# Patient Record
Sex: FEMALE | Employment: OTHER | URBAN - METROPOLITAN AREA
[De-identification: names, ages, dates, MRNs, and addresses within clinical notes are randomized per-mention and may not be internally consistent; named-entity substitution may affect disease eponyms.]

---

## 2019-08-04 ENCOUNTER — APPOINTMENT (OUTPATIENT)
Dept: RADIOLOGY | Facility: IMAGING CENTER | Age: 49
End: 2019-08-04
Attending: NURSE PRACTITIONER
Payer: COMMERCIAL

## 2019-08-04 ENCOUNTER — OFFICE VISIT (OUTPATIENT)
Dept: URGENT CARE | Facility: CLINIC | Age: 49
End: 2019-08-04
Payer: COMMERCIAL

## 2019-08-04 VITALS
BODY MASS INDEX: 27.89 KG/M2 | HEART RATE: 112 BPM | RESPIRATION RATE: 14 BRPM | HEIGHT: 65 IN | TEMPERATURE: 99.6 F | SYSTOLIC BLOOD PRESSURE: 130 MMHG | OXYGEN SATURATION: 98 % | WEIGHT: 167.4 LBS | DIASTOLIC BLOOD PRESSURE: 76 MMHG

## 2019-08-04 DIAGNOSIS — S97.112A CRUSHING INJURY OF LEFT GREAT TOE, INITIAL ENCOUNTER: ICD-10-CM

## 2019-08-04 DIAGNOSIS — S97.111A CRUSHING INJURY OF RIGHT GREAT TOE, INITIAL ENCOUNTER: ICD-10-CM

## 2019-08-04 DIAGNOSIS — S92.402A CLOSED DISPLACED FRACTURE OF PHALANX OF LEFT GREAT TOE, UNSPECIFIED PHALANX, INITIAL ENCOUNTER: ICD-10-CM

## 2019-08-04 DIAGNOSIS — S90.412A ABRASION OF LEFT GREAT TOE, INITIAL ENCOUNTER: ICD-10-CM

## 2019-08-04 PROCEDURE — 73660 X-RAY EXAM OF TOE(S): CPT | Mod: TC,RT | Performed by: NURSE PRACTITIONER

## 2019-08-04 PROCEDURE — 99204 OFFICE O/P NEW MOD 45 MIN: CPT | Performed by: NURSE PRACTITIONER

## 2019-08-04 ASSESSMENT — ENCOUNTER SYMPTOMS
CHILLS: 0
WEAKNESS: 0
DIZZINESS: 0
VOMITING: 0
NAUSEA: 0
JOINT SWELLING: 1
FEVER: 0
MYALGIAS: 0
EYE PAIN: 0
SHORTNESS OF BREATH: 0
NUMBNESS: 0
SORE THROAT: 0

## 2019-08-04 NOTE — PROGRESS NOTES
Subjective:     Fernanda Gomez is a 49 y.o. female who presents for Foot Swelling (LT foot injury cement block fell on it yesterday)       Foot Problem   This is a new problem. The current episode started yesterday (dropped cement brick on left foot). The problem occurs constantly. The problem has been unchanged. Associated symptoms include joint swelling. Pertinent negatives include no chest pain, chills, fever, myalgias, nausea, numbness, rash, sore throat, vomiting or weakness. Associated symptoms comments: Swelling with bruising of left great toe. . The symptoms are aggravated by walking. She has tried NSAIDs for the symptoms. The treatment provided no relief.   History reviewed. No pertinent past medical history.History reviewed. No pertinent surgical history.  Social History     Socioeconomic History   • Marital status:      Spouse name: Not on file   • Number of children: Not on file   • Years of education: Not on file   • Highest education level: Not on file   Occupational History   • Not on file   Social Needs   • Financial resource strain: Not on file   • Food insecurity:     Worry: Not on file     Inability: Not on file   • Transportation needs:     Medical: Not on file     Non-medical: Not on file   Tobacco Use   • Smoking status: Never Smoker   • Smokeless tobacco: Never Used   Substance and Sexual Activity   • Alcohol use: Not on file   • Drug use: Not on file   • Sexual activity: Not on file   Lifestyle   • Physical activity:     Days per week: Not on file     Minutes per session: Not on file   • Stress: Not on file   Relationships   • Social connections:     Talks on phone: Not on file     Gets together: Not on file     Attends Religion service: Not on file     Active member of club or organization: Not on file     Attends meetings of clubs or organizations: Not on file     Relationship status: Not on file   • Intimate partner violence:     Fear of current or ex partner: Not on file      "Emotionally abused: Not on file     Physically abused: Not on file     Forced sexual activity: Not on file   Other Topics Concern   • Not on file   Social History Narrative   • Not on file    History reviewed. No pertinent family history. Review of Systems   Constitutional: Negative for chills and fever.   HENT: Negative for sore throat.    Eyes: Negative for pain.   Respiratory: Negative for shortness of breath.    Cardiovascular: Negative for chest pain.   Gastrointestinal: Negative for nausea and vomiting.   Genitourinary: Negative for hematuria.   Musculoskeletal: Positive for joint pain and joint swelling. Negative for myalgias.   Skin: Negative for rash.   Neurological: Negative for dizziness, weakness and numbness.   No Known Allergies   Objective:   /76 (BP Location: Left arm, Patient Position: Sitting, BP Cuff Size: Adult)   Pulse (!) 112   Temp 37.6 °C (99.6 °F)   Resp 14   Ht 1.638 m (5' 4.5\")   Wt 75.9 kg (167 lb 6.4 oz)   SpO2 98%   BMI 28.29 kg/m²   Physical Exam   Constitutional: She is oriented to person, place, and time. She appears well-developed and well-nourished. No distress.   HENT:   Head: Normocephalic and atraumatic.   Eyes: Pupils are equal, round, and reactive to light. Conjunctivae and EOM are normal.   Cardiovascular: Normal rate and regular rhythm.   No murmur heard.  Pulmonary/Chest: Effort normal and breath sounds normal. No respiratory distress.   Abdominal: Soft. She exhibits no distension. There is no tenderness.   Musculoskeletal:        Left foot: There is decreased range of motion, tenderness and bony tenderness. There is no swelling and no deformity.        Feet:    Neurological: She is alert and oriented to person, place, and time. She has normal reflexes. No sensory deficit.   Skin: Skin is warm and dry.   Psychiatric: She has a normal mood and affect.         Assessment/Plan:   Assessment    1. Crushing injury of left great toe, initial encounter  DX-TOE(S) 2+ " RIGHT    REFERRAL TO SPORTS MEDICINE   2. Closed displaced fracture of phalanx of left great toe, unspecified phalanx, initial encounter  REFERRAL TO SPORTS MEDICINE   3. Abrasion of left great toe, initial encounter  mupirocin (BACTROBAN) 2 % Ointment     Xray results  1.  Acute comminuted nondisplaced fracture of the midportion of the distal phalanx of the left great toe with possible extension of the fracture to the articular surface of the base of the distal phalanx.  2.  No dislocation.  3.  No other toe fracture identified.      Fracture noted of the left great phalanx.  Patient apply mupirocin topically to small abrasion.  Patient placed in a walking boot for support.  Will patient follow-up with sports medicine for further evaluation management.. Advised may take 600-800 mg ibuprofen 3 times daily as needed for pain.   Patient given precautionary s/sx that mandate immediate follow up and evaluation in the ED. Advised of risks of not doing so.    DDX, Supportive care, and indications for immediate follow-up discussed with patient.    Instructed to return to clinic or nearest emergency department if we are not available for any change in condition, further concerns, or worsening of symptoms.    The patient demonstrated a good understanding and agreed with the treatment plan.

## 2019-08-13 ENCOUNTER — OFFICE VISIT (OUTPATIENT)
Dept: MEDICAL GROUP | Facility: CLINIC | Age: 49
End: 2019-08-13
Payer: COMMERCIAL

## 2019-08-13 ENCOUNTER — APPOINTMENT (OUTPATIENT)
Dept: RADIOLOGY | Facility: IMAGING CENTER | Age: 49
End: 2019-08-13
Attending: FAMILY MEDICINE
Payer: COMMERCIAL

## 2019-08-13 VITALS
OXYGEN SATURATION: 95 % | RESPIRATION RATE: 14 BRPM | HEART RATE: 98 BPM | SYSTOLIC BLOOD PRESSURE: 118 MMHG | BODY MASS INDEX: 27.88 KG/M2 | HEIGHT: 65 IN | WEIGHT: 167.33 LBS | TEMPERATURE: 98.4 F | DIASTOLIC BLOOD PRESSURE: 80 MMHG

## 2019-08-13 DIAGNOSIS — S92.912B: ICD-10-CM

## 2019-08-13 PROCEDURE — 28490 TREAT BIG TOE FRACTURE: CPT | Mod: LT | Performed by: FAMILY MEDICINE

## 2019-08-13 PROCEDURE — 73660 X-RAY EXAM OF TOE(S): CPT | Mod: TC,LT | Performed by: FAMILY MEDICINE

## 2019-08-13 ASSESSMENT — ENCOUNTER SYMPTOMS
FEVER: 0
DIZZINESS: 0
NAUSEA: 0
CHILLS: 1
SHORTNESS OF BREATH: 0
HEADACHES: 1
VOMITING: 0

## 2019-09-06 ENCOUNTER — OFFICE VISIT (OUTPATIENT)
Dept: MEDICAL GROUP | Facility: CLINIC | Age: 49
End: 2019-09-06
Payer: COMMERCIAL

## 2019-09-06 ENCOUNTER — APPOINTMENT (OUTPATIENT)
Dept: RADIOLOGY | Facility: IMAGING CENTER | Age: 49
End: 2019-09-06
Attending: FAMILY MEDICINE
Payer: COMMERCIAL

## 2019-09-06 VITALS
SYSTOLIC BLOOD PRESSURE: 122 MMHG | DIASTOLIC BLOOD PRESSURE: 82 MMHG | BODY MASS INDEX: 27.88 KG/M2 | WEIGHT: 167.33 LBS | RESPIRATION RATE: 16 BRPM | HEIGHT: 65 IN | OXYGEN SATURATION: 95 % | HEART RATE: 91 BPM | TEMPERATURE: 98.5 F

## 2019-09-06 DIAGNOSIS — S92.912B: ICD-10-CM

## 2019-09-06 PROCEDURE — 73660 X-RAY EXAM OF TOE(S): CPT | Mod: TC,LT | Performed by: FAMILY MEDICINE

## 2019-09-06 PROCEDURE — 99024 POSTOP FOLLOW-UP VISIT: CPT | Performed by: FAMILY MEDICINE

## 2019-09-06 NOTE — PROGRESS NOTES
"Subjective:      Fernanda Gomez is a 49 y.o. female who presents with Foot Problem ((LT foot injury cement block fell on it 8/3/19). The patient would like to know how active she can be, favoring left foot/toe, not as sensitive as a week ago, impact at site isnt bleeding anymore and healing.) and Toe Injury      Referred by MARGIE Grossman for evaluation of LEFT great toe pain    HPI   LEFT great toe pain  Date of injury, August 4, 2019  Mechanism of injury, inadvertently dropped a cement block on her LEFT foot, not work-related  Sudden sharp pain at the LEFT great toe  No radiation  Improved with rest and immobilization  She was having some additional pain because she went on a trip where she did a lot of hiking in the boot without any specific injury    Retired, executive administrative/desk work  Activities include gardening and maintaining/cleaning     Objective:     /82 (BP Location: Right arm, Patient Position: Sitting, BP Cuff Size: Adult)   Pulse 91   Temp 36.9 °C (98.5 °F) (Temporal)   Resp 16   Ht 1.638 m (5' 4.5\")   Wt 75.9 kg (167 lb 5.3 oz)   SpO2 95%   BMI 28.28 kg/m²     Physical Exam    LEFT FOOT:  There is no swelling with ecchymosis noted at the foot   There is NO tenderness at the base of the fifth metatarsal, cuboid, or tarsal navicular  There is NO pain with metatarsal squeeze test    NEUTRAL stance  Able to ambulate with near normal gait in CAM Walker boot    Assessment/Plan:     1. Open nondisplaced fracture of distal phalanx of toe of left foot  DX-TOE(S) 2+ LEFT     Date of injury, August 4, 2019  Mechanism of injury, inadvertently dropped a cement block on her LEFT foot, not work-related    REPEAT x-rays performed (August 13, 2019) demonstrate stable fracture with minimal displacement despite possible articular involvement    REPEAT x-rays performed in the office TODAY (September 6, 2019) demonstrate stable fracture with minimal displacement despite possible " articular involvement    Continue fracture stabilization and short leg cam walker boot  Recommend immobilization for a total of 6 weeks from the date of injury (until on or after September 15, 2019)    Return in about 2 weeks (around 9/20/2019).  To see how she is doing coming out of the cam walker boot, particularly how her range of motion is doing at the great toe          9/6/2019 1:53 PM    HISTORY/REASON FOR EXAM:  Trauma, great toe fracture follow-up.      TECHNIQUE/EXAM DESCRIPTION AND NUMBER OF VIEWS:  3 views of the LEFT toes.    COMPARISON: 8/13/2019    FINDINGS:    BONE MINERALIZATION: Normal.  JOINTS: Mild-to-moderate first MTP osteoarthrosis. No erosions.  FRACTURE: Minimal interval healing of nondisplaced fracture of the distal phalanx of the great toe. Some fracture lines are still visualized.  DISLOCATION: None.  SOFT TISSUES: No mass.      Impression       Minimal interval healing of great toe distal phalangeal fracture. Some fracture lines are still visualized.                    8/4/2019 1:36 PM    HISTORY/REASON FOR EXAM:  Left great toe and 2nd toe pain for one day after dropping block on foot.      TECHNIQUE/EXAM DESCRIPTION AND NUMBER OF VIEWS:  3 views of the RIGHT toes.    COMPARISON: None    FINDINGS:  There is a nondisplaced comminuted fracture of the mid portion of the distal phalanx of the great toe with one area suspected extension to the articular surface of the base of the distal phalanx.  No dislocation is present.  The 2nd phalanges are intact.  The remainder the visualized left foot is intact.      Impression       1.  Acute comminuted nondisplaced fracture of the midportion of the distal phalanx of the left great toe with possible extension of the fracture to the articular surface of the base of the distal phalanx.    2.  No dislocation.    3.  No other toe fracture identified.        Thank you MARGIE Grossman for allowing me to participate in caring for your patient.

## 2019-10-02 ENCOUNTER — TELEPHONE (OUTPATIENT)
Dept: MEDICAL GROUP | Facility: CLINIC | Age: 49
End: 2019-10-02

## 2019-10-02 NOTE — TELEPHONE ENCOUNTER
The patient called regarding appointment and needed to reschedule from 10/03/19 @ 1315 to 10/14/19 @ 1230pm. The patient stated she is still having pain that is uncomfortable with moving the left big toe upward. At the prior visits, the patient has been doing xrays and she is still wearing the CAM Boot. The patient also mentioned that she has not taken any anti-inflammatories.   If you need anything else from me Dr. Friend, please let me know.  Karla HANEY

## 2019-10-14 ENCOUNTER — APPOINTMENT (OUTPATIENT)
Dept: RADIOLOGY | Facility: IMAGING CENTER | Age: 49
End: 2019-10-14
Attending: FAMILY MEDICINE
Payer: COMMERCIAL

## 2019-10-14 ENCOUNTER — OFFICE VISIT (OUTPATIENT)
Dept: MEDICAL GROUP | Facility: CLINIC | Age: 49
End: 2019-10-14
Payer: COMMERCIAL

## 2019-10-14 VITALS
HEART RATE: 91 BPM | SYSTOLIC BLOOD PRESSURE: 118 MMHG | HEIGHT: 65 IN | BODY MASS INDEX: 27.88 KG/M2 | WEIGHT: 167.33 LBS | RESPIRATION RATE: 16 BRPM | OXYGEN SATURATION: 95 % | TEMPERATURE: 97.8 F | DIASTOLIC BLOOD PRESSURE: 78 MMHG

## 2019-10-14 DIAGNOSIS — S92.912B: ICD-10-CM

## 2019-10-14 PROCEDURE — 73660 X-RAY EXAM OF TOE(S): CPT | Mod: TC,LT | Performed by: FAMILY MEDICINE

## 2019-10-14 PROCEDURE — 99024 POSTOP FOLLOW-UP VISIT: CPT | Performed by: FAMILY MEDICINE

## 2019-10-14 NOTE — PROGRESS NOTES
"Subjective:     HPI   FOLLOW LEFT great toe pain  Date of injury, August 4, 2019  Mechanism of injury, inadvertently dropped a cement block on her LEFT foot, not work-related  Sudden sharp pain at the LEFT great toe  No radiation  Improved with rest and continued use of stiff soled shoes  She has tried using shoes with a lift/elevated heel but this exacerbates her pain    She was having some additional pain because she went on a trip where she did a lot of hiking in the boot without any specific injury    Retired, executive administrative/desk work  Activities include gardening and maintaining/cleaning     Objective:     /78 (BP Location: Left arm, Patient Position: Sitting, BP Cuff Size: Adult)   Pulse 91   Temp 36.6 °C (97.8 °F) (Temporal)   Resp 16   Ht 1.638 m (5' 4.5\")   Wt 75.9 kg (167 lb 5.3 oz)   SpO2 95%   BMI 28.28 kg/m²     Physical Exam    LEFT FOOT:  There is no swelling with ecchymosis noted at the foot   There is NO tenderness at the base of the fifth metatarsal, cuboid, or tarsal navicular  There is NO pain with metatarsal squeeze test  Still having some POSITIVE tenderness at the fracture site and some discomfort with extreme motion at the IP joint of the LEFT great toe    NEUTRAL stance  Able to ambulate with near normal gait in CAM Walker boot    Assessment/Plan:     1. Open nondisplaced fracture of distal phalanx of toe of left foot  DX-TOE(S) 2+ LEFT     Date of injury, August 4, 2019  She is now 10 weeks post injury  Mechanism of injury, inadvertently dropped a cement block on her LEFT foot, not work-related    REPEAT x-rays performed (August 13, 2019) demonstrate stable fracture with minimal displacement despite possible articular involvement    REPEAT x-rays performed (September 6, 2019) demonstrate stable fracture with minimal displacement despite possible articular involvement    REPEAT x-rays performed in the office TODAY (October 14, 2019) demonstrate interval " healing    Discontinued short leg cam walker boot  Immobilized for a total of 6 weeks from the date of injury (REMOVED after September 15, 2019)    For now, she seems to be on track for healing, advised to avoid any painful activities  Follow-up as needed        10/14/2019 12:51 PM    HISTORY/REASON FOR EXAM:  Pain/Deformity Following Trauma; verify healing.  Follow-up 1st distal phalanx fracture    TECHNIQUE/EXAM DESCRIPTION AND NUMBER OF VIEWS:  3 views of the LEFT toes.    COMPARISON: Right great toe radiographs 9/6/2019    FINDINGS:  There is healing right 1st distal phalanx fracture. Alignment is anatomic.    There is osteoarthritis of the 1st metatarsophalangeal joint.      Impression       Healing left 1st distal phalanx fracture             9/6/2019 1:53 PM    HISTORY/REASON FOR EXAM:  Trauma, great toe fracture follow-up.      TECHNIQUE/EXAM DESCRIPTION AND NUMBER OF VIEWS:  3 views of the LEFT toes.    COMPARISON: 8/13/2019    FINDINGS:    BONE MINERALIZATION: Normal.  JOINTS: Mild-to-moderate first MTP osteoarthrosis. No erosions.  FRACTURE: Minimal interval healing of nondisplaced fracture of the distal phalanx of the great toe. Some fracture lines are still visualized.  DISLOCATION: None.  SOFT TISSUES: No mass.      Impression       Minimal interval healing of great toe distal phalangeal fracture. Some fracture lines are still visualized.                    8/4/2019 1:36 PM    HISTORY/REASON FOR EXAM:  Left great toe and 2nd toe pain for one day after dropping block on foot.      TECHNIQUE/EXAM DESCRIPTION AND NUMBER OF VIEWS:  3 views of the RIGHT toes.    COMPARISON: None    FINDINGS:  There is a nondisplaced comminuted fracture of the mid portion of the distal phalanx of the great toe with one area suspected extension to the articular surface of the base of the distal phalanx.  No dislocation is present.  The 2nd phalanges are intact.  The remainder the visualized left foot is intact.       Impression       1.  Acute comminuted nondisplaced fracture of the midportion of the distal phalanx of the left great toe with possible extension of the fracture to the articular surface of the base of the distal phalanx.    2.  No dislocation.    3.  No other toe fracture identified.        Thank you MARGIE Grossman for allowing me to participate in caring for your patient.

## 2022-04-16 ENCOUNTER — APPOINTMENT (OUTPATIENT)
Dept: RADIOLOGY | Facility: IMAGING CENTER | Age: 52
End: 2022-04-16
Attending: PHYSICIAN ASSISTANT
Payer: COMMERCIAL

## 2022-04-16 ENCOUNTER — OFFICE VISIT (OUTPATIENT)
Dept: URGENT CARE | Facility: PHYSICIAN GROUP | Age: 52
End: 2022-04-16
Payer: COMMERCIAL

## 2022-04-16 VITALS
SYSTOLIC BLOOD PRESSURE: 136 MMHG | HEART RATE: 99 BPM | RESPIRATION RATE: 20 BRPM | WEIGHT: 165 LBS | BODY MASS INDEX: 28.17 KG/M2 | OXYGEN SATURATION: 95 % | HEIGHT: 64 IN | DIASTOLIC BLOOD PRESSURE: 68 MMHG | TEMPERATURE: 98.3 F

## 2022-04-16 DIAGNOSIS — M79.672 LEFT FOOT PAIN: ICD-10-CM

## 2022-04-16 DIAGNOSIS — M77.42 METATARSALGIA OF LEFT FOOT: ICD-10-CM

## 2022-04-16 PROCEDURE — 73630 X-RAY EXAM OF FOOT: CPT | Mod: TC,LT | Performed by: PHYSICIAN ASSISTANT

## 2022-04-16 PROCEDURE — 99213 OFFICE O/P EST LOW 20 MIN: CPT | Performed by: PHYSICIAN ASSISTANT

## 2022-04-16 NOTE — PROGRESS NOTES
"Subjective:   Fernanda Gomez is a 52 y.o. female who presents for Foot Pain (2x wk; left. )     Tripped and fell and hit dorsum of left foot 2 weeks ago. Did not fall to ground or roll ankle.   Dull and throbbing since.  Pain abated to dorsum area and is now located primarily on the plantar surface of the foot.  Painful when walking barefoot or with shoe without arch support.  No echymosis.  No swelling.  No fever or chills.  Has been exercising frequently doing HIIT training.  Does note pain while lunging and placing foot in dorsiflexion.  No n/t.        Medications:  mupirocin Oint    Allergies:             Lactose and Other environmental    Surgical History:         Past Surgical History:   Procedure Laterality Date   • EYE SURGERY Left    • MYOMECTOMY         Past Social Hx:  Fernanda Gomez  reports that she has never smoked. She has never used smokeless tobacco.     Past Family Hx:   Fernanda Gomez family history is not on file.       Problem list, medications, and allergies reviewed by myself today in Epic.     Objective:     /68 (BP Location: Left arm, Patient Position: Sitting, BP Cuff Size: Adult)   Pulse 99   Temp 36.8 °C (98.3 °F) (Temporal)   Resp 20   Ht 1.626 m (5' 4\")   Wt 74.8 kg (165 lb)   SpO2 95%   BMI 28.32 kg/m²     Physical Exam  Vitals and nursing note reviewed.   Constitutional:       General: She is not in acute distress.     Appearance: Normal appearance. She is not ill-appearing.   HENT:      Head: Normocephalic.   Eyes:      Extraocular Movements: Extraocular movements intact.      Pupils: Pupils are equal, round, and reactive to light.   Cardiovascular:      Rate and Rhythm: Normal rate.   Pulmonary:      Effort: Pulmonary effort is normal.   Musculoskeletal:        Feet:    Feet:      Comments: No ttp to first mtp joint.  ttp to plantar side of first second and third metatarsals.  Ankle ROM full.  NO anterior drawer.  DP intact.  Cap refill wnl.  No cellulitis. No " ulcerations.  Skin:     General: Skin is warm.      Findings: No rash.   Neurological:      Mental Status: She is alert and oriented to person, place, and time.   Psychiatric:         Thought Content: Thought content normal.         Judgment: Judgment normal.         RADIOLOGY RESULTS   DX-FOOT-COMPLETE 3+ LEFT    Result Date: 4/16/2022 4/16/2022 4:48 PM HISTORY/REASON FOR EXAM:  Pain/Deformity Following Trauma left foot pain in the arch of the foot after a GLF x 2 weeks ago TECHNIQUE/EXAM DESCRIPTION AND NUMBER OF VIEWS: 3 views of the LEFT foot. COMPARISON:  None. FINDINGS: No acute fracture or dislocation. Mild osteoarthritis of the first MTP joint and first CMC joint. Os naviculare. Small plantar calcaneal enthesophyte.     No acute osseous abnormality.           Assessment/Plan:     Diagnosis and Associated Orders:     1. Left foot pain  - DX-FOOT-COMPLETE 3+ LEFT; Future  - Referral to Sports Medicine    2. Metatarsalgia of left foot        Comments/MDM:    Patient presents with 2 week h/o left metatarsalgia.  H/o trip and fall with initial pain to dorsum of foot that has resolved.  Pain at plantar surface, painful weightbearing.  Recommend use of short boot when walking.  RICE treatment.  Avoid high impact activities. Xrays negative. Referral to sports medicine placed for future intervention and treatment.    I personally reviewed prior external notes and test results pertinent to today's visit.  Red flags discussed as well as indications to present to the Emergency Department.  Supportive care, natural history, differential diagnoses, and indications for immediate follow-up discussed.  Patient expresses understanding and agrees to plan.  Patient denies any other questions or concerns.    Follow-up with the primary care physician for recheck, reevaluation, and consideration of further management.      Please note that this dictation was created using voice recognition software. I have made a reasonable  attempt to correct obvious errors, but I expect that there are errors of grammar and possibly content that I did not discover before finalizing the note.    This note was electronically signed by So Gibbs PA-C

## 2022-11-10 ENCOUNTER — TELEPHONE (OUTPATIENT)
Dept: SCHEDULING | Facility: IMAGING CENTER | Age: 52
End: 2022-11-10

## 2022-12-06 ENCOUNTER — OFFICE VISIT (OUTPATIENT)
Dept: MEDICAL GROUP | Facility: MEDICAL CENTER | Age: 52
End: 2022-12-06
Payer: COMMERCIAL

## 2022-12-06 VITALS
HEIGHT: 64 IN | WEIGHT: 180.78 LBS | SYSTOLIC BLOOD PRESSURE: 110 MMHG | OXYGEN SATURATION: 98 % | DIASTOLIC BLOOD PRESSURE: 70 MMHG | TEMPERATURE: 98.2 F | BODY MASS INDEX: 30.86 KG/M2 | HEART RATE: 93 BPM

## 2022-12-06 DIAGNOSIS — Z78.0 POSTMENOPAUSAL: ICD-10-CM

## 2022-12-06 DIAGNOSIS — Z11.59 NEED FOR HEPATITIS C SCREENING TEST: ICD-10-CM

## 2022-12-06 DIAGNOSIS — F43.9 SITUATIONAL STRESS: ICD-10-CM

## 2022-12-06 DIAGNOSIS — F43.10 PTSD (POST-TRAUMATIC STRESS DISORDER): ICD-10-CM

## 2022-12-06 DIAGNOSIS — Z00.00 WELLNESS EXAMINATION: ICD-10-CM

## 2022-12-06 DIAGNOSIS — F40.298 FEAR OF NEEDLES: ICD-10-CM

## 2022-12-06 DIAGNOSIS — Z12.31 ENCOUNTER FOR SCREENING MAMMOGRAM FOR BREAST CANCER: ICD-10-CM

## 2022-12-06 DIAGNOSIS — Z12.11 COLON CANCER SCREENING: ICD-10-CM

## 2022-12-06 DIAGNOSIS — E66.9 OBESITY (BMI 30-39.9): ICD-10-CM

## 2022-12-06 DIAGNOSIS — Z23 NEED FOR VACCINATION: ICD-10-CM

## 2022-12-06 PROCEDURE — 90471 IMMUNIZATION ADMIN: CPT | Performed by: BEHAVIOR ANALYST

## 2022-12-06 PROCEDURE — 90715 TDAP VACCINE 7 YRS/> IM: CPT | Performed by: BEHAVIOR ANALYST

## 2022-12-06 PROCEDURE — 99214 OFFICE O/P EST MOD 30 MIN: CPT | Mod: 25 | Performed by: BEHAVIOR ANALYST

## 2022-12-06 PROCEDURE — 90472 IMMUNIZATION ADMIN EACH ADD: CPT | Performed by: BEHAVIOR ANALYST

## 2022-12-06 PROCEDURE — 90686 IIV4 VACC NO PRSV 0.5 ML IM: CPT | Performed by: BEHAVIOR ANALYST

## 2022-12-06 RX ORDER — ALPRAZOLAM 0.25 MG/1
.25-.5 TABLET ORAL NIGHTLY PRN
Qty: 4 TABLET | Refills: 0 | Status: SHIPPED | OUTPATIENT
Start: 2022-12-06 | End: 2022-12-06

## 2022-12-06 RX ORDER — ALPRAZOLAM 0.25 MG/1
.25-.5 TABLET ORAL
Qty: 4 TABLET | Refills: 0 | Status: SHIPPED | OUTPATIENT
Start: 2022-12-06 | End: 2023-01-03

## 2022-12-06 ASSESSMENT — PATIENT HEALTH QUESTIONNAIRE - PHQ9: CLINICAL INTERPRETATION OF PHQ2 SCORE: 0

## 2022-12-06 NOTE — ASSESSMENT & PLAN NOTE
- Referral to behavioral health placed.   -She is overdue for screening blood work. We discussed the importance of not avoiding necessary diagnostic testing and medical care especially as she ages. When we do obtain lab work we will attempt to obtain comprehensive panels to avoid subsequent lab work considering her fear of needles.   -Prescribed 0.25mg alprazolam take 1 tab 2 hours prior to obtaining blood draw for lab work.  If needed, she can take 1 additional tab 1 hour after first dose. Provided a total of 4 tablets in case subsequent lab work is needed.  Patient has been educated this medication can be highly addictive. Discussed possible side effects including sedation.  She will need a  to and from her lab work.  Patient states her  is willing and available to do this.   - Obtained and reviewed patient utilization report from [facility] pharmacy database on 12/6/2022 2:45 PM  prior to writing prescription for controlled substance II, III or IV per [place] bill . Based on assessment of the report, the prescription is medically necessary. Per report she has never been prescribed a controlled substance in the state of [place].

## 2022-12-06 NOTE — LETTER
[facility]  MARIA LUISA Woods.  [address]  [facility] [address]  Fax: [phone]   Authorization for Release/Disclosure of   Protected Health Information   Name: WILFRED LOPEZ : 1970 SSN: [id number]   Address: [address] [place] [id number] Phone:    [phone] (home)    I authorize the entity listed below to release/disclose the PHI below to:   [facility]/MARGIE Woods and MARGIE Woods   Provider or Entity Name:  [facility]    Address   [facility], [facility]   Phone:      Fax:     Reason for request: continuity of care   Information to be released:    [  ] LAST COLONOSCOPY,  including any PATH REPORT and follow-up  [  ] LAST FIT/COLOGUARD RESULT [  ] LAST DEXA  [  ] LAST MAMMOGRAM  [XX ] LAST PAP  [  ] LAST LABS [  ] RETINA EXAM REPORT  [  ] IMMUNIZATION RECORDS  [  ] Release all info    **Recent progress notes, surgery records      [  ] Check here and initial the line next to each item to release ALL health information INCLUDING  _____ Care and treatment for drug and / or alcohol abuse  _____ HIV testing, infection status, or AIDS  _____ Genetic Testing    DATES OF SERVICE OR TIME PERIOD TO BE DISCLOSED: _____________  I understand and acknowledge that:  * This Authorization may be revoked at any time by you in writing, except if your health information has already been used or disclosed.  * Your health information that will be used or disclosed as a result of you signing this authorization could be re-disclosed by the recipient. If this occurs, your re-disclosed health information may no longer be protected by State or Federal laws.  * You may refuse to sign this Authorization. Your refusal will not affect your ability to obtain treatment.  * This Authorization becomes effective upon signing and will  on (date) __________.      If no date is indicated, this Authorization will  one (1) year from the  signature date.    Name: Fernanda Gomez    Signature:   Date:     12/6/2022       PLEASE FAX REQUESTED RECORDS BACK TO: [phone]

## 2022-12-06 NOTE — ASSESSMENT & PLAN NOTE
- Chronic condition.  Patient is interested in discussing weight loss strategies which we will discussed more after lab work has been completed.   - Encouraged healthy diet and regular exercise.

## 2022-12-06 NOTE — ASSESSMENT & PLAN NOTE
- She would like a new women's health provider. Recommend she see Dr. Alonzo at [facility].   -Requested records including Pap smear results from  [facility].  She said she had a Pap smear about 3 years ago.
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
## 2022-12-06 NOTE — ASSESSMENT & PLAN NOTE
- Chronic, untreated condition leading to fear of medical treatment and needles.  Patient has avoided appropriate medical care for several years.   - Referral to behavioral health placed- specifically referring her to [facility] behavioral health.

## 2023-02-28 ENCOUNTER — PATIENT MESSAGE (OUTPATIENT)
Dept: MEDICAL GROUP | Facility: MEDICAL CENTER | Age: 53
End: 2023-02-28
Payer: COMMERCIAL

## 2023-04-08 ENCOUNTER — OFFICE VISIT (OUTPATIENT)
Dept: URGENT CARE | Facility: PHYSICIAN GROUP | Age: 53
End: 2023-04-08
Payer: COMMERCIAL

## 2023-04-08 VITALS
BODY MASS INDEX: 30.7 KG/M2 | SYSTOLIC BLOOD PRESSURE: 128 MMHG | OXYGEN SATURATION: 93 % | WEIGHT: 179.8 LBS | RESPIRATION RATE: 16 BRPM | HEART RATE: 93 BPM | TEMPERATURE: 97.5 F | HEIGHT: 64 IN | DIASTOLIC BLOOD PRESSURE: 62 MMHG

## 2023-04-08 DIAGNOSIS — J01.90 ACUTE NON-RECURRENT SINUSITIS, UNSPECIFIED LOCATION: ICD-10-CM

## 2023-04-08 DIAGNOSIS — R05.1 ACUTE COUGH: ICD-10-CM

## 2023-04-08 PROCEDURE — 99213 OFFICE O/P EST LOW 20 MIN: CPT | Performed by: NURSE PRACTITIONER

## 2023-04-08 RX ORDER — FLUTICASONE PROPIONATE 50 MCG
SPRAY, SUSPENSION (ML) NASAL
Qty: 9.1 ML | Refills: 0 | Status: SHIPPED | OUTPATIENT
Start: 2023-04-08

## 2023-04-08 RX ORDER — BENZONATATE 200 MG/1
200 CAPSULE ORAL EVERY 8 HOURS PRN
Qty: 30 CAPSULE | Refills: 0 | Status: SHIPPED | OUTPATIENT
Start: 2023-04-08

## 2023-04-08 RX ORDER — AMOXICILLIN AND CLAVULANATE POTASSIUM 875; 125 MG/1; MG/1
1 TABLET, FILM COATED ORAL 2 TIMES DAILY
Qty: 10 TABLET | Refills: 0 | Status: SHIPPED | OUTPATIENT
Start: 2023-04-08 | End: 2023-04-13

## 2023-04-08 RX ORDER — GUAIFENESIN 600 MG/1
TABLET, EXTENDED RELEASE ORAL
COMMUNITY
Start: 2023-04-06

## 2023-04-08 RX ORDER — BENZONATATE 200 MG/1
200 CAPSULE ORAL EVERY 8 HOURS PRN
Qty: 30 CAPSULE | Refills: 0 | Status: SHIPPED
Start: 2023-04-08 | End: 2023-04-08

## 2023-04-08 RX ORDER — ACETAMINOPHEN AND CHLORPHENIRAMINE MALEATE 325; 2 MG/1; MG/1
TABLET, FILM COATED ORAL
COMMUNITY
Start: 2023-03-28

## 2023-04-08 ASSESSMENT — ENCOUNTER SYMPTOMS
CARDIOVASCULAR NEGATIVE: 1
MYALGIAS: 1
SPUTUM PRODUCTION: 1
COUGH: 1
SHORTNESS OF BREATH: 0
WHEEZING: 0

## 2023-04-08 ASSESSMENT — VISUAL ACUITY: OU: 1

## 2023-04-08 NOTE — PROGRESS NOTES
"Subjective:     Fernanda Gomez is a 53 y.o. female who presents for Cough (X5 days, tested NEG for COVID for the past 3 weeks has taken a at home test every week, Productive with mucus ) and Sinus Problem (X5 days )    \"Fighting a nasty cold & cough for weeks now. Been taking Coricidin HBP maximum strength cold & flu, and Mucinex HBP, chloraseptic throat spray and ricola throat lozenges. Eating homemade chicken soup, crackers, tea, water.\"       Cough  This is a new problem. The problem has been gradually worsening. The cough is Productive of sputum. Associated symptoms include myalgias. Pertinent negatives include no chest pain, shortness of breath or wheezing.   Sinus Problem  Associated symptoms include congestion and coughing. Pertinent negatives include no shortness of breath.     Patient reports on March 18, she started to develop cold symptoms.  Had initial improvement.  Her  got sick.  She got sick again.  Reports cough and nasal congestion and runny nose.  Reports greenish color to her mucus.  Sleeping 10 hours when she normally sleeps 6.  Shoulders feel achy.  Has been performing supportive measures and using OTC medication including Mucinex, Coricidin HBP and lozenges as needed.  Home COVID test was negative.    Review of Systems   Constitutional:  Positive for malaise/fatigue.   HENT:  Positive for congestion.    Respiratory:  Positive for cough and sputum production. Negative for shortness of breath and wheezing.    Cardiovascular: Negative.  Negative for chest pain.   Musculoskeletal:  Positive for myalgias.   All other systems reviewed and are negative.    Refer to HPI for additional details.    During this visit, appropriate PPE was worn, hand hygiene was performed, and the patient and any visitors were masked.    PMH:  has a past medical history of Headache, Hot flashes due to menopause, Lactose intolerance, Post-menopausal, PTSD (post-traumatic stress disorder), and Weight gain.    MEDS: " "  Current Outpatient Medications:     guaiFENesin ER (MUCINEX) 600 MG TABLET SR 12 HR, , Disp: , Rfl:     Chlorpheniramine-APAP (CORICIDIN) 2-325 MG Tab, , Disp: , Rfl:     benzonatate (TESSALON) 200 MG capsule, Take 1 Capsule by mouth every 8 hours as needed for Cough., Disp: 30 Capsule, Rfl: 0    amoxicillin-clavulanate (AUGMENTIN) 875-125 MG Tab, Take 1 Tablet by mouth 2 times a day for 5 days., Disp: 10 Tablet, Rfl: 0    fluticasone (FLONASE) 50 MCG/ACT nasal spray, 1 spray in each nostril 2 times a day, Disp: 9.1 mL, Rfl: 0    Loratadine (CLARITIN PO), Take  by mouth. (Patient not taking: Reported on 4/8/2023), Disp: , Rfl:     ALLERGIES:   Allergies   Allergen Reactions    Lactose      GI Issue.    Other Environmental      Pollens.     SURGHX:   Past Surgical History:   Procedure Laterality Date    EYE SURGERY Left     MYOMECTOMY       SOCHX:  reports that she has never smoked. She has never used smokeless tobacco.    FH: Per HPI as applicable/pertinent.      Objective:     /62 (BP Location: Left arm, Patient Position: Sitting, BP Cuff Size: Adult)   Pulse 93   Temp 36.4 °C (97.5 °F) (Temporal)   Resp 16   Ht 1.626 m (5' 4\")   Wt 81.6 kg (179 lb 12.8 oz)   SpO2 93%   BMI 30.86 kg/m²     Physical Exam  Nursing note reviewed.   Constitutional:       General: She is not in acute distress.     Appearance: She is well-developed. She is not ill-appearing or toxic-appearing.   HENT:      Head: Normocephalic.      Nose: Nose normal.      Mouth/Throat:      Mouth: Mucous membranes are moist.      Pharynx: Oropharynx is clear.   Eyes:      General: Vision grossly intact.      Extraocular Movements: Extraocular movements intact.      Conjunctiva/sclera: Conjunctivae normal.   Cardiovascular:      Rate and Rhythm: Normal rate and regular rhythm.      Heart sounds: Normal heart sounds.   Pulmonary:      Effort: Pulmonary effort is normal. No respiratory distress.      Breath sounds: Normal breath sounds. No " stridor. No decreased breath sounds, wheezing, rhonchi or rales.   Musculoskeletal:         General: No deformity. Normal range of motion.      Cervical back: Normal range of motion.   Skin:     General: Skin is warm and dry.      Coloration: Skin is not pale.   Neurological:      Mental Status: She is alert and oriented to person, place, and time.      Motor: No weakness.   Psychiatric:         Behavior: Behavior normal. Behavior is cooperative.       Assessment/Plan:     1. Acute non-recurrent sinusitis, unspecified location  - amoxicillin-clavulanate (AUGMENTIN) 875-125 MG Tab; Take 1 Tablet by mouth 2 times a day for 5 days.  Dispense: 10 Tablet; Refill: 0  - fluticasone (FLONASE) 50 MCG/ACT nasal spray; 1 spray in each nostril 2 times a day  Dispense: 9.1 mL; Refill: 0    2. Acute cough  - benzonatate (TESSALON) 200 MG capsule; Take 1 Capsule by mouth every 8 hours as needed for Cough.  Dispense: 30 Capsule; Refill: 0    Rx as above sent electronically.    Differential diagnosis, natural history, supportive care, rest, fluids, over-the-counter symptom management per 's instructions, OTC, close monitoring, and indications for immediate follow-up discussed.     Vital signs stable, afebrile, no acute distress at this time. Warning signs reviewed. Return precautions discussed.     All questions answered. Patient agrees with the plan of care.    Discharge summary provided via [employer].

## 2023-04-14 ENCOUNTER — HOSPITAL ENCOUNTER (OUTPATIENT)
Dept: RADIOLOGY | Facility: MEDICAL CENTER | Age: 53
End: 2023-04-14
Attending: BEHAVIOR ANALYST
Payer: COMMERCIAL

## 2023-04-14 DIAGNOSIS — Z12.31 ENCOUNTER FOR SCREENING MAMMOGRAM FOR BREAST CANCER: ICD-10-CM

## 2023-04-14 PROCEDURE — 77063 BREAST TOMOSYNTHESIS BI: CPT

## 2023-04-20 ENCOUNTER — HOSPITAL ENCOUNTER (OUTPATIENT)
Dept: RADIOLOGY | Facility: MEDICAL CENTER | Age: 53
End: 2023-04-20
Payer: COMMERCIAL

## 2023-08-07 ENCOUNTER — APPOINTMENT (OUTPATIENT)
Dept: MEDICAL GROUP | Facility: MEDICAL CENTER | Age: 53
End: 2023-08-07
Payer: COMMERCIAL

## 2023-10-23 ENCOUNTER — APPOINTMENT (OUTPATIENT)
Dept: MEDICAL GROUP | Facility: MEDICAL CENTER | Age: 53
End: 2023-10-23
Payer: COMMERCIAL

## 2024-07-25 ENCOUNTER — HOSPITAL ENCOUNTER (OUTPATIENT)
Dept: RADIOLOGY | Facility: MEDICAL CENTER | Age: 54
End: 2024-07-25
Attending: CHIROPRACTOR
Payer: COMMERCIAL

## 2024-07-25 DIAGNOSIS — M99.01 CERVICAL SEGMENT DYSFUNCTION: ICD-10-CM

## 2024-07-25 DIAGNOSIS — M99.03 SOMATIC DYSFUNCTION OF LUMBAR REGION: ICD-10-CM

## 2024-07-25 DIAGNOSIS — M99.04 SOMATIC DYSFUNCTION OF SACRAL REGION: ICD-10-CM

## 2024-07-25 DIAGNOSIS — M99.02 THORACIC SEGMENT DYSFUNCTION: ICD-10-CM

## 2024-07-25 PROCEDURE — 72072 X-RAY EXAM THORAC SPINE 3VWS: CPT

## 2024-07-25 PROCEDURE — 72100 X-RAY EXAM L-S SPINE 2/3 VWS: CPT

## 2024-07-25 PROCEDURE — 72170 X-RAY EXAM OF PELVIS: CPT

## 2024-07-25 PROCEDURE — 72040 X-RAY EXAM NECK SPINE 2-3 VW: CPT

## 2025-07-16 ENCOUNTER — HOSPITAL ENCOUNTER (OUTPATIENT)
Dept: LAB | Facility: MEDICAL CENTER | Age: 55
End: 2025-07-16
Payer: COMMERCIAL

## 2025-07-16 ENCOUNTER — OFFICE VISIT (OUTPATIENT)
Dept: URGENT CARE | Facility: PHYSICIAN GROUP | Age: 55
End: 2025-07-16
Payer: COMMERCIAL

## 2025-07-16 VITALS
SYSTOLIC BLOOD PRESSURE: 158 MMHG | RESPIRATION RATE: 18 BRPM | HEIGHT: 64 IN | WEIGHT: 170 LBS | DIASTOLIC BLOOD PRESSURE: 98 MMHG | TEMPERATURE: 97.7 F | BODY MASS INDEX: 29.02 KG/M2 | HEART RATE: 103 BPM | OXYGEN SATURATION: 95 %

## 2025-07-16 DIAGNOSIS — R11.0 NAUSEA: Primary | ICD-10-CM

## 2025-07-16 DIAGNOSIS — Z01.89 ROUTINE LAB DRAW: ICD-10-CM

## 2025-07-16 DIAGNOSIS — Z78.0 MENOPAUSE: ICD-10-CM

## 2025-07-16 DIAGNOSIS — F41.9 ANXIETY: ICD-10-CM

## 2025-07-16 DIAGNOSIS — Z91.09 ENVIRONMENTAL ALLERGIES: ICD-10-CM

## 2025-07-16 LAB
25(OH)D3 SERPL-MCNC: 40 NG/ML (ref 30–100)
ALBUMIN SERPL BCP-MCNC: 5 G/DL (ref 3.2–4.9)
ALBUMIN/GLOB SERPL: 1.6 G/DL
ALP SERPL-CCNC: 93 U/L (ref 30–99)
ALT SERPL-CCNC: 58 U/L (ref 2–50)
ANION GAP SERPL CALC-SCNC: 16 MMOL/L (ref 7–16)
AST SERPL-CCNC: 30 U/L (ref 12–45)
BASOPHILS # BLD AUTO: 0.5 % (ref 0–1.8)
BASOPHILS # BLD: 0.06 K/UL (ref 0–0.12)
BILIRUB SERPL-MCNC: 0.8 MG/DL (ref 0.1–1.5)
BUN SERPL-MCNC: 11 MG/DL (ref 8–22)
CALCIUM ALBUM COR SERPL-MCNC: 9.4 MG/DL (ref 8.5–10.5)
CALCIUM SERPL-MCNC: 10.2 MG/DL (ref 8.5–10.5)
CHLORIDE SERPL-SCNC: 103 MMOL/L (ref 96–112)
CHOLEST SERPL-MCNC: 259 MG/DL (ref 100–199)
CO2 SERPL-SCNC: 20 MMOL/L (ref 20–33)
CREAT SERPL-MCNC: 0.88 MG/DL (ref 0.5–1.4)
EOSINOPHIL # BLD AUTO: 0.03 K/UL (ref 0–0.51)
EOSINOPHIL NFR BLD: 0.3 % (ref 0–6.9)
ERYTHROCYTE [DISTWIDTH] IN BLOOD BY AUTOMATED COUNT: 40.8 FL (ref 35.9–50)
EST. AVERAGE GLUCOSE BLD GHB EST-MCNC: 111 MG/DL
GFR SERPLBLD CREATININE-BSD FMLA CKD-EPI: 78 ML/MIN/1.73 M 2
GLOBULIN SER CALC-MCNC: 3.1 G/DL (ref 1.9–3.5)
GLUCOSE SERPL-MCNC: 115 MG/DL (ref 65–99)
HBA1C MFR BLD: 5.5 % (ref 4–5.6)
HCT VFR BLD AUTO: 46.3 % (ref 37–47)
HDLC SERPL-MCNC: 58 MG/DL
HGB BLD-MCNC: 16.1 G/DL (ref 12–16)
IMM GRANULOCYTES # BLD AUTO: 0.04 K/UL (ref 0–0.11)
IMM GRANULOCYTES NFR BLD AUTO: 0.4 % (ref 0–0.9)
LDLC SERPL CALC-MCNC: 174 MG/DL
LYMPHOCYTES # BLD AUTO: 2.04 K/UL (ref 1–4.8)
LYMPHOCYTES NFR BLD: 18.1 % (ref 22–41)
MCH RBC QN AUTO: 31.7 PG (ref 27–33)
MCHC RBC AUTO-ENTMCNC: 34.8 G/DL (ref 32.2–35.5)
MCV RBC AUTO: 91.1 FL (ref 81.4–97.8)
MONOCYTES # BLD AUTO: 0.57 K/UL (ref 0–0.85)
MONOCYTES NFR BLD AUTO: 5.1 % (ref 0–13.4)
NEUTROPHILS # BLD AUTO: 8.51 K/UL (ref 1.82–7.42)
NEUTROPHILS NFR BLD: 75.6 % (ref 44–72)
NRBC # BLD AUTO: 0 K/UL
NRBC BLD-RTO: 0 /100 WBC (ref 0–0.2)
PLATELET # BLD AUTO: 351 K/UL (ref 164–446)
PMV BLD AUTO: 9.8 FL (ref 9–12.9)
POTASSIUM SERPL-SCNC: 3.9 MMOL/L (ref 3.6–5.5)
PROT SERPL-MCNC: 8.1 G/DL (ref 6–8.2)
RBC # BLD AUTO: 5.08 M/UL (ref 4.2–5.4)
SODIUM SERPL-SCNC: 139 MMOL/L (ref 135–145)
TRIGL SERPL-MCNC: 134 MG/DL (ref 0–149)
TSH SERPL DL<=0.005 MIU/L-ACNC: 1.24 UIU/ML (ref 0.38–5.33)
WBC # BLD AUTO: 11.3 K/UL (ref 4.8–10.8)

## 2025-07-16 PROCEDURE — 84443 ASSAY THYROID STIM HORMONE: CPT

## 2025-07-16 PROCEDURE — 85025 COMPLETE CBC W/AUTO DIFF WBC: CPT

## 2025-07-16 PROCEDURE — 3080F DIAST BP >= 90 MM HG: CPT

## 2025-07-16 PROCEDURE — 83036 HEMOGLOBIN GLYCOSYLATED A1C: CPT

## 2025-07-16 PROCEDURE — 80061 LIPID PANEL: CPT

## 2025-07-16 PROCEDURE — 82306 VITAMIN D 25 HYDROXY: CPT

## 2025-07-16 PROCEDURE — 36415 COLL VENOUS BLD VENIPUNCTURE: CPT

## 2025-07-16 PROCEDURE — 80053 COMPREHEN METABOLIC PANEL: CPT

## 2025-07-16 PROCEDURE — 99214 OFFICE O/P EST MOD 30 MIN: CPT

## 2025-07-16 PROCEDURE — 3077F SYST BP >= 140 MM HG: CPT

## 2025-07-16 RX ORDER — HYDROXYZINE HYDROCHLORIDE 25 MG/1
25-50 TABLET, FILM COATED ORAL 3 TIMES DAILY PRN
Qty: 15 TABLET | Refills: 0 | Status: SHIPPED | OUTPATIENT
Start: 2025-07-16 | End: 2025-07-17 | Stop reason: SDUPTHER

## 2025-07-16 RX ORDER — FLUTICASONE PROPIONATE 50 MCG
2 SPRAY, SUSPENSION (ML) NASAL DAILY
Qty: 9.9 ML | Refills: 0 | Status: SHIPPED | OUTPATIENT
Start: 2025-07-16

## 2025-07-16 RX ORDER — ONDANSETRON 4 MG/1
4 TABLET, ORALLY DISINTEGRATING ORAL EVERY 6 HOURS PRN
Qty: 20 TABLET | Refills: 0 | Status: SHIPPED | OUTPATIENT
Start: 2025-07-16

## 2025-07-16 ASSESSMENT — ENCOUNTER SYMPTOMS
TINGLING: 1
GASTROINTESTINAL NEGATIVE: 1
CARDIOVASCULAR NEGATIVE: 1
RESPIRATORY NEGATIVE: 1
EYES NEGATIVE: 1
MUSCULOSKELETAL NEGATIVE: 1
NERVOUS/ANXIOUS: 1
CONSTITUTIONAL NEGATIVE: 1

## 2025-07-16 NOTE — PROGRESS NOTES
Subjective:   Chief Complaint  Fernanda Gomez is a 55 y.o. female who presents for Menopause (Anxiety x 1 month , nausea in the morning x 1 month )      History of Present Illness  Patient presents for symptoms revolving around her menopausal symptoms.  She states for the past month she has been struggling with increased anxiety as well as morning nausea.  She states that she has been attempting to manage her symptoms at home with homeopathic and holistic interventions, but as time is gone on these interventions have been losing their effect and now she feels more overwhelmed than normal.  She states that she would like treatment for her anxiety related to having her blood drawn.  She was states that she would also like a medication to help her with the nausea that she has been experiencing for this past month.  She denies any bodies, chills, fevers.  She denies any difficulty breathing or shortness of breath.  Denies any chest pain.        Review of Systems  Review of Systems   Constitutional: Negative.    HENT: Negative.     Eyes: Negative.    Respiratory: Negative.     Cardiovascular: Negative.    Gastrointestinal: Negative.    Genitourinary: Negative.    Musculoskeletal: Negative.    Skin: Negative.    Neurological:  Positive for tingling.        Patient reports bilateral upper extremity and bilateral lower extremity tingling.   Psychiatric/Behavioral:  The patient is nervous/anxious.        Past Medical History  Past Medical History[1]    Family History  Family History   Problem Relation Age of Onset    Brain Aneurysm Mother     Diabetes Father     Heart Disease Father     Cancer Sister 52        cervical cancer    Brain Aneurysm Maternal Grandmother     Ovarian Cancer Neg Hx     Tubal Cancer Neg Hx     Peritoneal Cancer Neg Hx     Colorectal Cancer Neg Hx     Breast Cancer Neg Hx        Social History  Social History[2]    Surgical History  Past Surgical History[3]    Current Medications  Home Medications   "     Reviewed by Lon Coley Ass't (Medical Assistant) on 07/16/25 at 1449  Med List Status: <None>     Medication Last Dose Status   benzonatate (TESSALON) 200 MG capsule Not Taking Active   Chlorpheniramine-APAP (CORICIDIN) 2-325 MG Tab Not Taking Active   fluticasone (FLONASE) 50 MCG/ACT nasal spray Taking Active   guaiFENesin ER (MUCINEX) 600 MG TABLET SR 12 HR Not Taking Active   Loratadine (CLARITIN PO) Not Taking Active                    Allergies  Allergies[4]       Objective:     BP (!) 158/98 (BP Location: Right arm, Patient Position: Sitting, BP Cuff Size: Adult)   Pulse (!) 103   Temp 36.5 °C (97.7 °F) (Temporal)   Resp 18   Ht 1.626 m (5' 4\")   Wt 77.1 kg (170 lb)   SpO2 95%     Physical Exam  Constitutional:       Appearance: Normal appearance.   HENT:      Head: Normocephalic and atraumatic.      Right Ear: Tympanic membrane, ear canal and external ear normal.      Left Ear: Tympanic membrane, ear canal and external ear normal.      Nose: Nose normal.      Mouth/Throat:      Mouth: Mucous membranes are moist.      Pharynx: Oropharynx is clear.   Eyes:      Extraocular Movements: Extraocular movements intact.      Conjunctiva/sclera: Conjunctivae normal.      Pupils: Pupils are equal, round, and reactive to light.   Cardiovascular:      Rate and Rhythm: Normal rate and regular rhythm.      Pulses: Normal pulses.      Heart sounds: Normal heart sounds.   Pulmonary:      Effort: Pulmonary effort is normal.      Breath sounds: Normal breath sounds.   Abdominal:      General: Abdomen is flat. Bowel sounds are normal.      Palpations: Abdomen is soft.   Skin:     General: Skin is warm and dry.      Capillary Refill: Capillary refill takes less than 2 seconds.   Neurological:      General: No focal deficit present.      Mental Status: She is alert and oriented to person, place, and time.   Psychiatric:         Mood and Affect: Mood is anxious.         Behavior: Behavior normal.         Thought " Content: Thought content normal.         Judgment: Judgment normal.         Assessment/Plan:     Diagnosis  1. Nausea  - ondansetron (ZOFRAN ODT) 4 MG TABLET DISPERSIBLE; Take 1 Tablet by mouth every 6 hours as needed for Nausea/Vomiting for up to 20 doses.  Dispense: 20 Tablet; Refill: 0    2. Anxiety  - hydrOXYzine HCl (ATARAX) 25 MG Tab; Take 1-2 Tablets by mouth 3 times a day as needed for Itching for up to 15 doses.  Dispense: 15 Tablet; Refill: 0    3. Environmental allergies  - fluticasone (FLONASE) 50 MCG/ACT nasal spray; Administer 2 Sprays into affected nostril(S) every day.  Dispense: 9.9 mL; Refill: 0    4. Routine lab draw  - CBC WITH DIFFERENTIAL; Future  - Comp Metabolic Panel; Future  - LIPID PANEL WITH LDL/HDL RATIO  - TSH WITH REFLEX TO FT4; Future  - VITAMIN D,25 HYDROXY (DEFICIENCY); Future  - HEMOGLOBIN A1C; Future    5. Menopause        MDM/Plan/Discussion  At this time the patient would like to wait to be evaluated by her PCP before starting any chronic medications for anxiety that require regular follow-up.  At this time we will trial hydroxyzine for her anxiety and she will follow-up with her PCP at 9 in the morning tomorrow.  The patient will also be prescribed Zofran for her intermittent nausea.  I will also order routine blood work for the patient since she has not had any performed within the past 10 years.  She does state that she feels confident and ready to have her blood work done today prior to seeing her PCP tomorrow and would like to attempt to have it drawn if possible.        Shared decision-making was utilized with patient for treatment plan. Medication discussed included indication for use and the potential benefits and side effects. Education was provided regarding the aforementioned assessments.  Differential Diagnosis, natural history, and supportive care discussed. All of the patient's questions were answered to their satisfaction at the time of discharge. Patient was  encouraged to monitor symptoms closely. Those signs and symptoms which would warrant concern and mandate seeking a higher level of service through the emergency department discussed at length.  Patient stated agreement and understanding of this plan of care.    Advised the patient to follow-up with the primary care physician for recheck, reevaluation, and consideration of further management.    Please note that this dictation was created using voice recognition software. I have made a reasonable attempt to correct obvious errors, but I expect that there are errors of grammar and possibly content that I did not discover before finalizing the note.    This note was electronically signed by MARGIE Jansen           [1]   Past Medical History:  Diagnosis Date    Headache     Hot flashes due to menopause     Lactose intolerance     Post-menopausal     PTSD (post-traumatic stress disorder)     needles, blood draws    Weight gain    [2]   Social History  Socioeconomic History    Marital status:    Tobacco Use    Smoking status: Never    Smokeless tobacco: Never   Vaping Use    Vaping status: Never Used   [3]   Past Surgical History:  Procedure Laterality Date    EYE SURGERY Left     MYOMECTOMY     [4]   Allergies  Allergen Reactions    Hydromorphone Anxiety, Nausea, Palpitations and Photosensitivity    Lactose      GI Issue.    Latex Hives and Itching    Other Environmental      Pollens.

## 2025-07-17 ENCOUNTER — OFFICE VISIT (OUTPATIENT)
Dept: MEDICAL GROUP | Facility: MEDICAL CENTER | Age: 55
End: 2025-07-17
Payer: COMMERCIAL

## 2025-07-17 VITALS
HEART RATE: 94 BPM | SYSTOLIC BLOOD PRESSURE: 130 MMHG | BODY MASS INDEX: 28.51 KG/M2 | OXYGEN SATURATION: 98 % | TEMPERATURE: 97.9 F | HEIGHT: 64 IN | WEIGHT: 167 LBS | DIASTOLIC BLOOD PRESSURE: 80 MMHG

## 2025-07-17 DIAGNOSIS — R79.89 ELEVATED LFTS: ICD-10-CM

## 2025-07-17 DIAGNOSIS — Z78.0 POSTMENOPAUSAL: ICD-10-CM

## 2025-07-17 DIAGNOSIS — F40.298 FEAR OF NEEDLES: ICD-10-CM

## 2025-07-17 DIAGNOSIS — F33.9 MAJOR DEPRESSION, RECURRENT, CHRONIC (HCC): ICD-10-CM

## 2025-07-17 DIAGNOSIS — F41.9 ANXIETY: ICD-10-CM

## 2025-07-17 DIAGNOSIS — Z12.31 ENCOUNTER FOR SCREENING MAMMOGRAM FOR MALIGNANT NEOPLASM OF BREAST: Primary | ICD-10-CM

## 2025-07-17 PROCEDURE — 99215 OFFICE O/P EST HI 40 MIN: CPT | Performed by: BEHAVIOR ANALYST

## 2025-07-17 PROCEDURE — 3079F DIAST BP 80-89 MM HG: CPT | Performed by: BEHAVIOR ANALYST

## 2025-07-17 PROCEDURE — 3075F SYST BP GE 130 - 139MM HG: CPT | Performed by: BEHAVIOR ANALYST

## 2025-07-17 RX ORDER — HYDROXYZINE HYDROCHLORIDE 25 MG/1
25-50 TABLET, FILM COATED ORAL 3 TIMES DAILY PRN
Qty: 90 TABLET | Refills: 2 | Status: SHIPPED | OUTPATIENT
Start: 2025-07-17

## 2025-07-17 RX ORDER — LORATADINE 10 MG/1
10 TABLET ORAL DAILY
COMMUNITY

## 2025-07-17 RX ORDER — SERTRALINE HYDROCHLORIDE 25 MG/1
25 TABLET, FILM COATED ORAL DAILY
Qty: 30 TABLET | Refills: 2 | Status: SHIPPED | OUTPATIENT
Start: 2025-07-17

## 2025-07-17 ASSESSMENT — ANXIETY QUESTIONNAIRES
GAD7 TOTAL SCORE: 21
6. BECOMING EASILY ANNOYED OR IRRITABLE: NEARLY EVERY DAY
3. WORRYING TOO MUCH ABOUT DIFFERENT THINGS: NEARLY EVERY DAY
2. NOT BEING ABLE TO STOP OR CONTROL WORRYING: NEARLY EVERY DAY
4. TROUBLE RELAXING: NEARLY EVERY DAY
7. FEELING AFRAID AS IF SOMETHING AWFUL MIGHT HAPPEN: NEARLY EVERY DAY
IF YOU CHECKED OFF ANY PROBLEMS ON THIS QUESTIONNAIRE, HOW DIFFICULT HAVE THESE PROBLEMS MADE IT FOR YOU TO DO YOUR WORK, TAKE CARE OF THINGS AT HOME, OR GET ALONG WITH OTHER PEOPLE: VERY DIFFICULT
5. BEING SO RESTLESS THAT IT IS HARD TO SIT STILL: NEARLY EVERY DAY
1. FEELING NERVOUS, ANXIOUS, OR ON EDGE: NEARLY EVERY DAY

## 2025-07-17 ASSESSMENT — PATIENT HEALTH QUESTIONNAIRE - PHQ9
5. POOR APPETITE OR OVEREATING: 3 - NEARLY EVERY DAY
CLINICAL INTERPRETATION OF PHQ2 SCORE: 4
SUM OF ALL RESPONSES TO PHQ QUESTIONS 1-9: 14

## 2025-07-17 ASSESSMENT — FIBROSIS 4 INDEX: FIB4 SCORE: 0.59

## 2025-07-17 NOTE — PROGRESS NOTES
Subjective:     CC:    Chief Complaint   Patient presents with    Menopause     Menopause symptoms, feeling very shaky, lots of anxiety. Nausea is very bad. No appetite, force's herself to eat sometimes.        Anxiety     Seeing a therapist.  Isolation is really getting to her.           HISTORY OF THE PRESENT ILLNESS:   Fernanda presents today with    She has been dealing with intense anxiety and related nausea and skin tingling. She also reports panic attacks.  Many situational stresses in her life alongside hx of anxiety and PTSD.   She went to urgent care for this yesterday. Hydroxyzine 25-50mg and Zofran 4mg  PRN was just prescribed by urgent care and she was provided lab orders  She has taken 1 dose of hydroxyzine and found it helpful. Did not wake up this morning with anxiety symptoms but anxiety has increased since.      Since last September her anxiety has been more extreme.   She has been seeing a counselor. Going to [employer] and focuses on her deven.     She was using alcohol to treat her anxiety. At least 1 drink a day previously. But no more than 3 drinks .Has not had alcohol in 20 days.   Taking a joint supplement and ASHWAGANDHA.     She feels that her hormone fluctuations around menopause is not helping her anxiety and she is getting hot flashes.         7/17/2025     9:40 AM    RADHA-7 ANXIETY SCALE FLOWSHEET   Feeling nervous, anxious, or on edge 3   Not being able to stop or control worrying 3   Worrying too much about different things 3   Trouble relaxing 3   Being so restless that it is hard to sit still 3   Becoming easily annoyed or irritable 3   Feeling afraid as if something awful might happen 3   RADHA-7 Total Score 21   How difficult have these problems made it for you to do your work, take care of things at home, or get along with other people? Very difficult       Interpretation of RADHA-7 Total Score   Score Severity   0-4 Minimal Anxiety  5-9 Mild Anxiety   10-14 Moderate Anxiety  15-21  Severy Anxiety    Depression Screening    Little interest or pleasure in doing things?  1 - several days   Feeling down, depressed , or hopeless? 3 - nearly every day   Trouble falling or staying asleep, or sleeping too much?  3 - nearly every day   Feeling tired or having little energy?  0 - not at all   Poor appetite or overeating?  3 - nearly every day   Feeling bad about yourself - or that you are a failure or have let yourself or your family down? 1 - several days   Trouble concentrating on things, such as reading the newspaper or watching television? 3 - nearly every day   Moving or speaking so slowly that other people could have noticed.  Or the opposite - being so fidgety or restless that you have been moving around a lot more than usual?  0 - not at all   Thoughts that you would be better off dead, or of hurting yourself?  0 - not at all   Patient Health Questionnaire Score: 14       If depressive symptoms identified deferred to follow up visit unless specifically addressed in assesment and plan.    Interpretation of PHQ-9 Total Score   Score Severity   1-4 No Depression   5-9 Mild Depression   10-14 Moderate Depression   15-19 Moderately Severe Depression   20-27 Severe Depression     Problem   Anxiety       Current Outpatient Medications   Medication Sig    loratadine (CLARITIN) 10 MG Tab Take 10 mg by mouth every day.    ASHWAGANDHA GUMMIES PO Take 1 Each by mouth every day.    sertraline (ZOLOFT) 25 MG tablet Take 1 Tablet by mouth every day.    hydrOXYzine HCl (ATARAX) 25 MG Tab Take 1-2 Tablets by mouth 3 times a day as needed for Itching.    ondansetron (ZOFRAN ODT) 4 MG TABLET DISPERSIBLE Take 1 Tablet by mouth every 6 hours as needed for Nausea/Vomiting for up to 20 doses.    fluticasone (FLONASE) 50 MCG/ACT nasal spray Administer 2 Sprays into affected nostril(S) every day.    fluticasone (FLONASE) 50 MCG/ACT nasal spray 1 spray in each nostril 2 times a day (Patient not taking: Reported on  "7/17/2025)          ROS: See HPI      Objective:     Exam: /80 (BP Location: Right arm, Patient Position: Sitting, BP Cuff Size: Adult)   Pulse 94   Temp 36.6 °C (97.9 °F) (Temporal)   Ht 1.626 m (5' 4\")   Wt 75.8 kg (167 lb)   SpO2 98%   BMI 28.67 kg/m²   Body mass index is 28.67 kg/m².    Physical Exam  Constitutional:       Appearance: Normal appearance.   Cardiovascular:      Rate and Rhythm: Normal rate and regular rhythm.      Pulses: Normal pulses.      Heart sounds: Normal heart sounds.   Pulmonary:      Effort: Pulmonary effort is normal.      Breath sounds: Normal breath sounds.   Musculoskeletal:      Cervical back: Normal range of motion and neck supple.   Neurological:      Mental Status: She is alert.   Psychiatric:         Mood and Affect: Mood is anxious.              Hospital Outpatient Visit on 07/16/2025   Component Date Value Ref Range Status    WBC 07/16/2025 11.3 (H)  4.8 - 10.8 K/uL Final    RBC 07/16/2025 5.08  4.20 - 5.40 M/uL Final    Hemoglobin 07/16/2025 16.1 (H)  12.0 - 16.0 g/dL Final    Hematocrit 07/16/2025 46.3  37.0 - 47.0 % Final    MCV 07/16/2025 91.1  81.4 - 97.8 fL Final    MCH 07/16/2025 31.7  27.0 - 33.0 pg Final    MCHC 07/16/2025 34.8  32.2 - 35.5 g/dL Final    RDW 07/16/2025 40.8  35.9 - 50.0 fL Final    Platelet Count 07/16/2025 351  164 - 446 K/uL Final    MPV 07/16/2025 9.8  9.0 - 12.9 fL Final    Neutrophils-Polys 07/16/2025 75.60 (H)  44.00 - 72.00 % Final    Lymphocytes 07/16/2025 18.10 (L)  22.00 - 41.00 % Final    Monocytes 07/16/2025 5.10  0.00 - 13.40 % Final    Eosinophils 07/16/2025 0.30  0.00 - 6.90 % Final    Basophils 07/16/2025 0.50  0.00 - 1.80 % Final    Immature Granulocytes 07/16/2025 0.40  0.00 - 0.90 % Final    Nucleated RBC 07/16/2025 0.00  0.00 - 0.20 /100 WBC Final    Neutrophils (Absolute) 07/16/2025 8.51 (H)  1.82 - 7.42 K/uL Final    Includes immature neutrophils, if present.    Lymphs (Absolute) 07/16/2025 2.04  1.00 - 4.80 K/uL " Final    Monos (Absolute) 07/16/2025 0.57  0.00 - 0.85 K/uL Final    Eos (Absolute) 07/16/2025 0.03  0.00 - 0.51 K/uL Final    Baso (Absolute) 07/16/2025 0.06  0.00 - 0.12 K/uL Final    Immature Granulocytes (abs) 07/16/2025 0.04  0.00 - 0.11 K/uL Final    NRBC (Absolute) 07/16/2025 0.00  K/uL Final    Sodium 07/16/2025 139  135 - 145 mmol/L Final    Potassium 07/16/2025 3.9  3.6 - 5.5 mmol/L Final    Chloride 07/16/2025 103  96 - 112 mmol/L Final    Co2 07/16/2025 20  20 - 33 mmol/L Final    Anion Gap 07/16/2025 16.0  7.0 - 16.0 Final    Glucose 07/16/2025 115 (H)  65 - 99 mg/dL Final    Bun 07/16/2025 11  8 - 22 mg/dL Final    Creatinine 07/16/2025 0.88  0.50 - 1.40 mg/dL Final    Calcium 07/16/2025 10.2  8.5 - 10.5 mg/dL Final    Correct Calcium 07/16/2025 9.4  8.5 - 10.5 mg/dL Final    AST(SGOT) 07/16/2025 30  12 - 45 U/L Final    ALT(SGPT) 07/16/2025 58 (H)  2 - 50 U/L Final    Alkaline Phosphatase 07/16/2025 93  30 - 99 U/L Final    Total Bilirubin 07/16/2025 0.8  0.1 - 1.5 mg/dL Final    Albumin 07/16/2025 5.0 (H)  3.2 - 4.9 g/dL Final    Total Protein 07/16/2025 8.1  6.0 - 8.2 g/dL Final    Globulin 07/16/2025 3.1  1.9 - 3.5 g/dL Final    A-G Ratio 07/16/2025 1.6  g/dL Final    TSH 07/16/2025 1.240  0.380 - 5.330 uIU/mL Final    Comment: The 2011 American Thyroid Association (CARLOS) guidelines  recommended that the interpretation of thyroid function in  pregnancy be based on trimester specific reference ranges.    1st Trimester  0.100-2.500 uIU/L  2nd Trimester  0.200-3.000 uIU/L  3rd Trimester  0.300-3.500 uIU/L    These established reference ranges have not been validated  at [facility].      25-Hydroxy   Vitamin D 25 07/16/2025 40  30 - 100 ng/mL Final    Comment: Adult Ranges:   <20 ng/mL - Deficiency  20-29 ng/mL - Insufficiency   ng/mL - Sufficiency  Electrochemiluminescence binding assay performed using Roche thuy e  immunoassay analyzer.  The Elecsys Vitamin D total II assay is  intended for  the quantitative determination of total 25 hydroxyvitamin D in human serum  and plasma. This assay is to be used as an aid in the assessment of vitamin  D sufficiency in adults.      Glycohemoglobin 07/16/2025 5.5  4.0 - 5.6 % Final    Comment: Increased risk for diabetes:  5.7 -6.4%  Diabetes:  >6.4%  Glycemic control for adults with diabetes:  <7.0%    The above interpretations are per ADA guidelines.  Diagnosis  of diabetes mellitus on the basis of elevated Hemoglobin A1c  should be confirmed by repeating the Hb A1c test.      Est Avg Glucose 07/16/2025 111  mg/dL Final    Comment: The eAG calculation is based on the A1c-Derived Daily Glucose  (ADAG) study.  See the ADA's website for additional information.      Cholesterol,Tot 07/16/2025 259 (H)  100 - 199 mg/dL Final    Triglycerides 07/16/2025 134  0 - 149 mg/dL Final    HDL 07/16/2025 58  >=40 mg/dL Final    LDL 07/16/2025 174 (H)  <100 mg/dL Final    GFR (CKD-EPI) 07/16/2025 78  >60 mL/min/1.73 m 2 Final    Comment: Estimated Glomerular Filtration Rate is calculated using  race neutral CKD-EPI 2021 equation per NKF-ASN recommendations.          Assessment & Plan:     55 y.o. female with the following -     1. Anxiety  - chronic, severe and uncontrolled.   - - Counseled on importance of regular exercise, healthy diet, good sleep hygiene, and positive social interaction.   - Counseled patient on the importance of a combination of psychotherapy and antidepressant medication to treat depression and anxiety.  -Had thorough discussion with patient regarding the potential side effects of SSRIs.  Counseled patient on how we will titrate the sertraline.  Reiterated to patient that it can take 4 weeks to see initial effect and up to 8 weeks to see full therapeutic effect of the medication.   - continue hydroxyzine as needed.  We discussed potential side effects.  Do not take other antihistamines when taking hydroxyzine.  - sertraline (ZOLOFT) 25 MG  tablet; Take 1 Tablet by mouth every day.  Dispense: 30 Tablet; Refill: 2  - hydrOXYzine HCl (ATARAX) 25 MG Tab; Take 1-2 Tablets by mouth 3 times a day as needed for Itching.  Dispense: 90 Tablet; Refill: 2    2. Major depression, recurrent, chronic (HCC)  - chronic, uncontrolled. Due to her severe anxiety.   - sertraline (ZOLOFT) 25 MG tablet; Take 1 Tablet by mouth every day.  Dispense: 30 Tablet; Refill: 2    3. Postmenopausal  - due to severe anxiety does not get regular cancer screenings so is not an ideal candidate for HRT. .   - recommended SSRI, avoid HRT at this time.     4. Elevated LFTs  - new problem.   - Stop joint supplement and ASHWAGANDHA.   - continue to avoid alcohol   -we will discuss repeating lab work at future office visits.  She has severe phobia to needles    5. Encounter for screening mammogram for malignant neoplasm of breast  - MA-SCREENING MAMMO BILAT W/TOMOSYNTHESIS W/CAD; Future    Other orders  - loratadine (CLARITIN) 10 MG Tab; Take 10 mg by mouth every day.  - ASHWAGANDHA GUMMIES PO; Take 1 Each by mouth every day.      I spent a total of 43 minutes with record review, exam, communication with the patient, communication with other providers, and documentation of this encounter.     Return in about 4 weeks (around 8/14/2025) for med check.    Please note that this dictation was created using voice recognition software. I have made every reasonable attempt to correct obvious errors, but I expect that there are errors of grammar and possibly content that I did not discover before finalizing the note.

## 2025-07-18 ENCOUNTER — OFFICE VISIT (OUTPATIENT)
Dept: URGENT CARE | Facility: PHYSICIAN GROUP | Age: 55
End: 2025-07-18
Payer: COMMERCIAL

## 2025-07-18 VITALS
HEIGHT: 64 IN | RESPIRATION RATE: 14 BRPM | BODY MASS INDEX: 28.68 KG/M2 | OXYGEN SATURATION: 98 % | TEMPERATURE: 98.8 F | HEART RATE: 100 BPM | DIASTOLIC BLOOD PRESSURE: 88 MMHG | WEIGHT: 168 LBS | SYSTOLIC BLOOD PRESSURE: 148 MMHG

## 2025-07-18 DIAGNOSIS — Z78.0 MENOPAUSE: Primary | ICD-10-CM

## 2025-07-18 DIAGNOSIS — R03.0 ELEVATED BLOOD PRESSURE READING WITHOUT DIAGNOSIS OF HYPERTENSION: ICD-10-CM

## 2025-07-18 PROCEDURE — 99214 OFFICE O/P EST MOD 30 MIN: CPT

## 2025-07-18 RX ORDER — ESTRADIOL 0.03 MG/D
1 FILM, EXTENDED RELEASE TRANSDERMAL
Qty: 48 PATCH | Refills: 0 | Status: SHIPPED | OUTPATIENT
Start: 2025-07-18

## 2025-07-18 RX ORDER — PROGESTERONE 100 MG/1
100 CAPSULE ORAL DAILY
Qty: 90 CAPSULE | Refills: 1 | Status: SHIPPED | OUTPATIENT
Start: 2025-07-18

## 2025-07-18 ASSESSMENT — ENCOUNTER SYMPTOMS
FEVER: 0
CHILLS: 0
DIARRHEA: 1
NAUSEA: 1
NERVOUS/ANXIOUS: 1
SHORTNESS OF BREATH: 0

## 2025-07-18 ASSESSMENT — FIBROSIS 4 INDEX: FIB4 SCORE: 0.59

## 2025-07-18 NOTE — PROGRESS NOTES
"Chief Complaint   Patient presents with    Anxiety     Has been seen before, has felt dismissed and wants 2nd opinion, feels like fainting, ongoing x1m         HISTORY OF PRESENT ILLNESS: Patient is a pleasant 55 y.o. female who presents to urgent care today with concerns of nausea, anxiety, diarrhea, bilateral forearm skin tingling, and overall feeling \"not herself\".  Pt was evaluated in the UC on 7/16 for similar symptoms (except the diarrhea) where she had labs checks.   She was prescribed atarax and zofran at that time.  The zofran has helped her nausea but she still feels anxious despite the atarax.  Regarding her diarrhea she reports lactose intolerance and gluten sensitivity.  She attended a party yesterday and reports eating pizza which may have aggravated her symptoms.  She reports taking OTC imodium with good relief.      She saw her PCP yesterday where she was prescribed Zoloft.  She has not started the medication and presents to the clinic for a second opinion.  She denies any CP, SOB, fevers, or chill.  She is postmenopausal and reports her LMP was 3 years ago.  She also reports associated hot flashes, night sweats, mood changes, and sleep disturbances.      Patient Active Problem List    Diagnosis Date Noted    Anxiety 07/17/2025    Elevated LFTs 07/17/2025    Major depression, recurrent, chronic (HCC) 07/17/2025    PTSD (post-traumatic stress disorder) 12/06/2022    Fear of needles 12/06/2022    Postmenopausal 12/06/2022    Situational stress 12/06/2022    Obesity (BMI 30-39.9) 12/06/2022       Allergies:Hydromorphone, Lactose, Latex, and Other environmental    Current Medications and Prescriptions Ordered in Epic[1]    Past Medical History[2]    Social History[3]    Family Status   Relation Name Status    Mo  (Not Specified)    Fa  (Not Specified)    Sis  (Not Specified)    MGMo  (Not Specified)    Neg Hx  (Not Specified)   No partnership data on file     Family History   Problem Relation Age of " "Onset    Brain Aneurysm Mother     Diabetes Father     Heart Disease Father     Cancer Sister 52        cervical cancer    Brain Aneurysm Maternal Grandmother     Ovarian Cancer Neg Hx     Tubal Cancer Neg Hx     Peritoneal Cancer Neg Hx     Colorectal Cancer Neg Hx     Breast Cancer Neg Hx        Review of Systems   Constitutional:  Negative for chills and fever.   Respiratory:  Negative for shortness of breath.    Cardiovascular:  Negative for chest pain.   Gastrointestinal:  Positive for diarrhea and nausea.   Genitourinary:  Negative for dysuria, frequency and urgency.   Psychiatric/Behavioral:  The patient is nervous/anxious.        Exam:  BP (!) 148/88 (BP Location: Left arm, Patient Position: Sitting, BP Cuff Size: Adult)   Pulse 100   Temp 37.1 °C (98.8 °F) (Temporal)   Resp 14   Ht 1.626 m (5' 4\")   Wt 76.2 kg (168 lb)   SpO2 98%   Physical Exam  Vitals reviewed.   Constitutional:       General: She is not in acute distress.     Appearance: Normal appearance.   HENT:      Head: Normocephalic and atraumatic.   Eyes:      Conjunctiva/sclera: Conjunctivae normal.   Cardiovascular:      Rate and Rhythm: Tachycardia present.      Pulses: Normal pulses.   Pulmonary:      Effort: Pulmonary effort is normal.   Abdominal:      Tenderness: There is no abdominal tenderness.   Musculoskeletal:         General: Normal range of motion.      Cervical back: Normal range of motion.   Skin:     General: Skin is warm and dry.      Coloration: Skin is not pale.      Findings: No bruising.   Neurological:      General: No focal deficit present.      Mental Status: She is alert.      Motor: No weakness.      Coordination: Coordination normal.      Gait: Gait normal.   Psychiatric:         Thought Content: Thought content normal.      Comments: anxious             Assessment/Plan:  1. Menopause  - Estradiol 0.025 MG/24HR PATCH BIWEEKLY; Place 1 Patch on the skin two times a week.  Dispense: 48 Patch; Refill: 0  - " "progesterone (PROMETRIUM) 100 MG Cap; Take 1 Capsule by mouth every day. Please take this at time  Dispense: 90 Capsule; Refill: 1    2. Elevated blood pressure reading without diagnosis of hypertension    Other orders  - Cyanocobalamin (VITAMIN B-12 PO); Take  by mouth.    With her associated s/sx of hot flashes, night sweats, mood changes, sleep disturbances, anxiety, and \"not feeling like herself\", will trial low dose HRT.  Pt reports she still has her uterus and denies family hx of reproductive cancers. She does have a hx of uterine myomectomy.  An order for mammogram screening was placed by PCP yesterday.  Pt provided information to [facility] for long term management of HRT.    Upon review of her labs from 7/16, no evidence of anemia, electrolyte imbalance, or hyper/hypothyroidism. Elevated LFTs addressed on last PCP's note.     Patient has noted elevated hypertension today here in the office.  They are not currently on medications for this.  Education provided on uncontrolled hypertension as well as the need to keep a log.   Education on DASH diet and exercise for BP management provided.  Patient advised to follow-up with PCP and present log for ongoing management.  Patient currently denies any symptoms associated with hypertension.    Patient states understanding of the plan of care and is agreeable to treatment discussed.  Instructed to make an appointment, for follow up, with primary care provider.    Supportive care, differential diagnoses, and indications for immediate follow-up discussed with patient.   Pathogenesis of diagnosis discussed including typical length and natural progression.   Instructed to return to clinic or nearest emergency department for any change in condition, further concerns, or worsening of symptoms.          Please note that this dictation was created using voice recognition software. I have made every reasonable attempt to correct obvious errors, but I expect that " there are errors of grammar and possibly content that I did not discover before finalizing the note.         [1]   Current Outpatient Medications Ordered in Epic   Medication Sig Dispense Refill    Cyanocobalamin (VITAMIN B-12 PO) Take  by mouth.      Estradiol 0.025 MG/24HR PATCH BIWEEKLY Place 1 Patch on the skin two times a week. 48 Patch 0    progesterone (PROMETRIUM) 100 MG Cap Take 1 Capsule by mouth every day. Please take this at time 90 Capsule 1    loratadine (CLARITIN) 10 MG Tab Take 10 mg by mouth every day.      ASHWAGANDHA GUMMIES PO Take 1 Each by mouth every day.      sertraline (ZOLOFT) 25 MG tablet Take 1 Tablet by mouth every day. 30 Tablet 2    hydrOXYzine HCl (ATARAX) 25 MG Tab Take 1-2 Tablets by mouth 3 times a day as needed for Itching. 90 Tablet 2    ondansetron (ZOFRAN ODT) 4 MG TABLET DISPERSIBLE Take 1 Tablet by mouth every 6 hours as needed for Nausea/Vomiting for up to 20 doses. 20 Tablet 0    fluticasone (FLONASE) 50 MCG/ACT nasal spray Administer 2 Sprays into affected nostril(S) every day. 9.9 mL 0    fluticasone (FLONASE) 50 MCG/ACT nasal spray 1 spray in each nostril 2 times a day 9.1 mL 0     No current Epic-ordered facility-administered medications on file.   [2]   Past Medical History:  Diagnosis Date    Headache     Hot flashes due to menopause     Lactose intolerance     Post-menopausal     PTSD (post-traumatic stress disorder)     needles, blood draws    Weight gain    [3]   Social History  Tobacco Use    Smoking status: Never    Smokeless tobacco: Never   Vaping Use    Vaping status: Never Used   Substance Use Topics    Alcohol use: Yes     Comment: stopped 20 days ago. The last 3 months drinking everyday, no more than 3 drinks a day.    Drug use: Never

## 2025-08-05 ENCOUNTER — TELEPHONE (OUTPATIENT)
Dept: URGENT CARE | Facility: PHYSICIAN GROUP | Age: 55
End: 2025-08-05
Payer: COMMERCIAL

## 2025-08-05 DIAGNOSIS — Z78.0 MENOPAUSE: Primary | ICD-10-CM

## 2025-08-06 DIAGNOSIS — Z78.0 MENOPAUSE: ICD-10-CM

## 2025-08-06 RX ORDER — ESTRADIOL 0.05 MG/D
1 PATCH, EXTENDED RELEASE TRANSDERMAL
Qty: 12 PATCH | Refills: 1 | Status: SHIPPED | OUTPATIENT
Start: 2025-08-06 | End: 2025-08-08

## 2025-08-08 RX ORDER — ESTRADIOL 0.05 MG/D
1 PATCH, EXTENDED RELEASE TRANSDERMAL
Qty: 24 PATCH | Refills: 1 | Status: SHIPPED | OUTPATIENT
Start: 2025-08-08

## 2025-08-21 ENCOUNTER — APPOINTMENT (OUTPATIENT)
Dept: URGENT CARE | Facility: CLINIC | Age: 55
End: 2025-08-21
Payer: COMMERCIAL

## 2025-08-21 ENCOUNTER — OFFICE VISIT (OUTPATIENT)
Dept: URGENT CARE | Facility: PHYSICIAN GROUP | Age: 55
End: 2025-08-21
Payer: COMMERCIAL

## 2025-08-21 VITALS
HEIGHT: 64 IN | WEIGHT: 163.7 LBS | DIASTOLIC BLOOD PRESSURE: 90 MMHG | TEMPERATURE: 97.9 F | OXYGEN SATURATION: 97 % | RESPIRATION RATE: 16 BRPM | SYSTOLIC BLOOD PRESSURE: 150 MMHG | HEART RATE: 83 BPM | BODY MASS INDEX: 27.95 KG/M2

## 2025-08-21 DIAGNOSIS — F41.9 ANXIETY: ICD-10-CM

## 2025-08-21 DIAGNOSIS — T50.905A MEDICATION SIDE EFFECT, INITIAL ENCOUNTER: Primary | ICD-10-CM

## 2025-08-21 PROCEDURE — 3077F SYST BP >= 140 MM HG: CPT

## 2025-08-21 PROCEDURE — 3080F DIAST BP >= 90 MM HG: CPT

## 2025-08-21 PROCEDURE — 99214 OFFICE O/P EST MOD 30 MIN: CPT

## 2025-08-21 PROCEDURE — 1126F AMNT PAIN NOTED NONE PRSNT: CPT

## 2025-08-21 RX ORDER — PROPRANOLOL HYDROCHLORIDE 10 MG/1
10 TABLET ORAL 2 TIMES DAILY PRN
Qty: 30 TABLET | Refills: 0 | Status: SHIPPED | OUTPATIENT
Start: 2025-08-21

## 2025-08-21 ASSESSMENT — FIBROSIS 4 INDEX: FIB4 SCORE: 0.59

## 2025-08-21 ASSESSMENT — ENCOUNTER SYMPTOMS
ABDOMINAL PAIN: 1
MYALGIAS: 0
DIARRHEA: 0
CONSTIPATION: 0
SHORTNESS OF BREATH: 0
FEVER: 0
VOMITING: 0
WEIGHT LOSS: 1
NAUSEA: 1
COUGH: 0
BLOOD IN STOOL: 0
TINGLING: 1

## 2025-08-21 ASSESSMENT — PAIN SCALES - GENERAL: PAINLEVEL_OUTOF10: NO PAIN

## 2025-08-22 ENCOUNTER — APPOINTMENT (OUTPATIENT)
Dept: MEDICAL GROUP | Facility: MEDICAL CENTER | Age: 55
End: 2025-08-22
Payer: COMMERCIAL

## 2025-09-25 ENCOUNTER — APPOINTMENT (OUTPATIENT)
Dept: MEDICAL GROUP | Facility: MEDICAL CENTER | Age: 55
End: 2025-09-25
Payer: COMMERCIAL